# Patient Record
Sex: MALE | Race: BLACK OR AFRICAN AMERICAN | NOT HISPANIC OR LATINO | URBAN - METROPOLITAN AREA
[De-identification: names, ages, dates, MRNs, and addresses within clinical notes are randomized per-mention and may not be internally consistent; named-entity substitution may affect disease eponyms.]

---

## 2023-06-20 ENCOUNTER — EMERGENCY (EMERGENCY)
Facility: HOSPITAL | Age: 63
LOS: 1 days | Discharge: HOME | End: 2023-06-20
Attending: STUDENT IN AN ORGANIZED HEALTH CARE EDUCATION/TRAINING PROGRAM | Admitting: STUDENT IN AN ORGANIZED HEALTH CARE EDUCATION/TRAINING PROGRAM
Payer: COMMERCIAL

## 2023-06-20 VITALS
OXYGEN SATURATION: 100 % | DIASTOLIC BLOOD PRESSURE: 95 MMHG | RESPIRATION RATE: 14 BRPM | SYSTOLIC BLOOD PRESSURE: 151 MMHG | TEMPERATURE: 98 F | HEART RATE: 96 BPM

## 2023-06-20 PROCEDURE — 99284 EMERGENCY DEPT VISIT MOD MDM: CPT

## 2023-06-20 PROCEDURE — 73502 X-RAY EXAM HIP UNI 2-3 VIEWS: CPT | Mod: 26,RT

## 2023-06-20 RX ORDER — IBUPROFEN 200 MG
600 TABLET ORAL ONCE
Refills: 0 | Status: COMPLETED | OUTPATIENT
Start: 2023-06-20 | End: 2023-06-20

## 2023-06-20 RX ORDER — CYCLOBENZAPRINE HYDROCHLORIDE 10 MG/1
10 TABLET, FILM COATED ORAL ONCE
Refills: 0 | Status: COMPLETED | OUTPATIENT
Start: 2023-06-20 | End: 2023-06-20

## 2023-06-20 RX ORDER — LIDOCAINE 4 G/100G
1 CREAM TOPICAL ONCE
Refills: 0 | Status: COMPLETED | OUTPATIENT
Start: 2023-06-20 | End: 2023-06-20

## 2023-06-20 RX ORDER — ACETAMINOPHEN 500 MG
650 TABLET ORAL ONCE
Refills: 0 | Status: COMPLETED | OUTPATIENT
Start: 2023-06-20 | End: 2023-06-20

## 2023-06-20 RX ADMIN — Medication 650 MILLIGRAM(S): at 16:53

## 2023-06-20 RX ADMIN — Medication 600 MILLIGRAM(S): at 16:53

## 2023-06-20 RX ADMIN — LIDOCAINE 1 PATCH: 4 CREAM TOPICAL at 16:54

## 2023-06-20 RX ADMIN — CYCLOBENZAPRINE HYDROCHLORIDE 10 MILLIGRAM(S): 10 TABLET, FILM COATED ORAL at 16:54

## 2023-06-20 NOTE — ED ADULT TRIAGE NOTE - CHIEF COMPLAINT QUOTE
pt ambulatory to walk in triage with a right sided limp c/o right hip pain  s/p working on car yesterday, pt states he heard a pop and pain started. hx: htn

## 2023-06-20 NOTE — SBIRT NOTE ADULT - NSSBIRTBRIEFINTDET_GEN_A_CORE
. Provided SBIRT services: Full screen positive. Brief Intervention Performed. Screening results were reviewed with the patient and patient was provided information about healthy guidelines and potential negative consequences associated with level of risk. Motivation and readiness to reduce or stop use was discussed and goals and activities to make changes were suggested/offered.

## 2023-06-20 NOTE — ED PROVIDER NOTE - ATTENDING CONTRIBUTION TO CARE
Chevy Patel DO:  patient seen and evaluated with the resident.  I was present for key portions of the History & Physical, and I agree with the Impression & Plan. 63 yo m no reported pmh, pw r hip/back pain. PW wife bedside provides collateral.  Reports 2 days ago, was working, was picking up object, felt a pop in right lumbosacral spine.  Had pain to area, not rating, none worse with movement.  Worse with pressure.  No prior history.  Initially had mild right-sided leg paresthesias, have resolved completely patient states patient denies N/C, F/C, paresthesias, bowel/bladder incontinence, numbness in genitals.  Patient was HDS, well-appearing, no spinal midline tenderness.  Mild right-sided lumbosacral TTP paraspinal.  SI LT in LE bilateral.  DP 2+ bilateral.  5/5 motor strength and motion bilateral.  No discrete neurovascular deficits.  Will treat symptoms, likely msk. Note do not suspect cauda equina, cord compression, spinal fracture.

## 2023-06-20 NOTE — ED PROVIDER NOTE - NSFOLLOWUPCLINICS_GEN_ALL_ED_FT
[facility] Orthopedic Surgery  Orthopedic Surgery  [address], [address] & [address]th [place] [address]  Phone: [phone]  Fax:   Follow Up Time: 4-6 Days

## 2023-06-20 NOTE — ED PROVIDER NOTE - PATIENT PORTAL LINK FT
You can access the FollowMyHealth Patient Portal offered by [facility] by registering at the following website: [email or web address]. By joining [facility]’s FollowMyHealth portal, you will also be able to view your health information using other applications (apps) compatible with our system.

## 2023-06-20 NOTE — ED PROVIDER NOTE - CLINICAL SUMMARY MEDICAL DECISION MAKING FREE TEXT BOX
Low concern for fracture given mechanism and able to range knee/walk on it possible/probable MSK in origin pain given positive straight leg raise test in addition to reproducible tenderness of the right lower back.

## 2023-06-20 NOTE — ED PROVIDER NOTE - NSFOLLOWUPINSTRUCTIONS_ED_ALL_ED_FT
Today you were seen in the ED for hip pain    Please visit with the orthopedic doctor, information for such can be found below.     It was found that you have No signs of medical emergency warranting further evaluation in the emergency department.    A copy of your results attached this document below.    Please follow up with Your primary care provider in regards to today's event.    Please return to the ED if you have any of the following:     The hip is the joint between the upper legs and the lower pelvis. The bones, cartilage, tendons, and muscles of your hip joint support your body and allow you to move around.    Hip pain can range from a minor ache to severe pain in one or both of your hips. The pain may be felt on the inside of the hip joint near the groin, or on the outside near the buttocks and upper thigh. You may also have swelling or stiffness in your hip area.    Follow these instructions at home:  Managing pain, stiffness, and swelling        If directed, put ice on the painful area. To do this:  Put ice in a plastic bag.  Place a towel between your skin and the bag.  Leave the ice on for 20 minutes, 2–3 times a day.  If directed, apply heat to the affected area as often as told by your health care provider. Use the heat source that your health care provider recommends, such as a moist heat pack or a heating pad.  Place a towel between your skin and the heat source.  Leave the heat on for 20–30 minutes.  Remove the heat if your skin turns bright red. This is especially important if you are unable to feel pain, heat, or cold. You may have a greater risk of getting burned.  Activity    Do exercises as told by your health care provider.  Avoid activities that cause pain.  General instructions      Take over-the-counter and prescription medicines only as told by your health care provider.  Keep a journal of your symptoms. Write down:  How often you have hip pain.  The location of your pain.  What the pain feels like.  What makes the pain worse.  Sleep with a pillow between your legs on your most comfortable side.  Keep all follow-up visits as told by your health care provider. This is important.  Contact a health care provider if:  You cannot put weight on your leg.  Your pain or swelling continues or gets worse after one week.  It gets harder to walk.  You have a fever.  Get help right away if:  You fall.  You have a sudden increase in pain and swelling in your hip.  Your hip is red or swollen or very tender to touch.  Summary  Hip pain can range from a minor ache to severe pain in one or both of your hips.  The pain may be felt on the inside of the hip joint near the groin, or on the outside near the buttocks and upper thigh.  Avoid activities that cause pain.  Write down how often you have hip pain, the location of the pain, what makes it worse, and what it feels like.  This information is not intended to replace advice given to you by your health care provider. Make sure you discuss any questions you have with your health care provider.

## 2023-06-20 NOTE — ED PROVIDER NOTE - OBJECTIVE STATEMENT
62-year-old male without any significant past medical conditions chief complaint of right lower back pain.  Symptoms started yesterday when he was working on his car was lifting up a tire.  Patient stated he felt a pop in his back and has been having right lower back pain/difficulty walking since then.  Denies any other systemic symptoms such as fever chest pain shortness breath nausea or vomiting.  Patient has been able to ambulate at home and in the ED but has having difficulty secondary to the pain in his lower back.  Denies any other trauma to the back

## 2023-06-20 NOTE — ED ADULT NURSE NOTE - OBJECTIVE STATEMENT
Landon RN- Received pt in intake room 3. pt A&OX4, ambulatory. pt c/o right hip pain s/p working on a car yesterday, states heard a "pop" in the hip. pt ambulatory but with limp. no obvious injury or deformity noted. pt in no distress. denies any other complaints. no chest pain, sob, cough, fever/chills, headache, dizziness, n/v. respirations are equal and nonlabored ,no respiratory distress noted. pt stable, medicated as per orders. awaiting further plan

## 2023-06-20 NOTE — ED PROVIDER NOTE - PHYSICAL EXAMINATION
GENERAL: Awake, alert, NAD  ABDOMEN: Soft, , non tender, non distended, no rebound, no guarding  BACK: No midline spinal tenderness,   EXT: Positive straight leg raise test on the right lower extremity neuro vas intact otherwise of the right lower extremity tenderness to the right lower back Gait slight instability  NEURO: A&Ox3. Moving all extremities.  SKIN: Warm and dry. No rash.  PSYCH: Normal affect. GENERAL: Awake, alert, NAD  ABDOMEN: Soft, , non tender, non distended, no rebound, no guarding  BACK: No midline spinal tenderness,   EXT: Positive straight leg raise test on the right lower extremity neuro vas intact otherwise of the right lower extremity tenderness to the right lower back Gait slight instability  NEURO: A&Ox3. Moving all extremities.  SKIN: Warm and dry. No rash.  PSYCH: Normal affect.  Rectal exam circumferential tone

## 2023-06-20 NOTE — ED ADULT NURSE NOTE - NSFALLUNIVINTERV_ED_ALL_ED
Bed/Stretcher in lowest position, wheels locked, appropriate side rails in place/Call bell, personal items and telephone in reach/Instruct patient to call for assistance before getting out of bed/chair/stretcher/Non-slip footwear applied when patient is off stretcher/[place]scar to call system/Physically safe environment - no spills, clutter or unnecessary equipment/Purposeful proactive rounding/Room/bathroom lighting operational, light cord in reach